# Patient Record
Sex: FEMALE | Race: WHITE | NOT HISPANIC OR LATINO | URBAN - METROPOLITAN AREA
[De-identification: names, ages, dates, MRNs, and addresses within clinical notes are randomized per-mention and may not be internally consistent; named-entity substitution may affect disease eponyms.]

---

## 2022-06-22 ENCOUNTER — TRANSFERRED RECORDS (OUTPATIENT)
Dept: HEALTH INFORMATION MANAGEMENT | Facility: CLINIC | Age: 57
End: 2022-06-22

## 2022-12-15 ENCOUNTER — LAB REQUISITION (OUTPATIENT)
Dept: LAB | Facility: CLINIC | Age: 57
End: 2022-12-15
Payer: COMMERCIAL

## 2022-12-15 ENCOUNTER — TRANSFERRED RECORDS (OUTPATIENT)
Dept: HEALTH INFORMATION MANAGEMENT | Facility: CLINIC | Age: 57
End: 2022-12-15

## 2022-12-15 DIAGNOSIS — D48.5 NEOPLASM OF UNCERTAIN BEHAVIOR OF SKIN: ICD-10-CM

## 2022-12-15 PROCEDURE — 88305 TISSUE EXAM BY PATHOLOGIST: CPT | Mod: TC,ORL | Performed by: DERMATOLOGY

## 2022-12-15 PROCEDURE — 88305 TISSUE EXAM BY PATHOLOGIST: CPT | Mod: 26 | Performed by: DERMATOLOGY

## 2022-12-19 ENCOUNTER — TELEPHONE (OUTPATIENT)
Dept: DERMATOLOGY | Facility: CLINIC | Age: 57
End: 2022-12-19
Payer: COMMERCIAL

## 2022-12-19 LAB
PATH REPORT.COMMENTS IMP SPEC: ABNORMAL
PATH REPORT.COMMENTS IMP SPEC: ABNORMAL
PATH REPORT.COMMENTS IMP SPEC: YES
PATH REPORT.FINAL DX SPEC: ABNORMAL
PATH REPORT.GROSS SPEC: ABNORMAL
PATH REPORT.MICROSCOPIC SPEC OTHER STN: ABNORMAL
PATH REPORT.RELEVANT HX SPEC: ABNORMAL

## 2022-12-19 NOTE — TELEPHONE ENCOUNTER
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 Thompson or Nahed. She will need mohs with immunostaining. She is aware and expecting your call. Name: Clarissa Cifuentes, CLARA 63, phone [phone]         Please let me know if you need anything else,    Thanks!              Yu Alberts MD    Dermatology    [facility]

## 2022-12-20 ENCOUNTER — MEDICAL CORRESPONDENCE (OUTPATIENT)
Dept: HEALTH INFORMATION MANAGEMENT | Facility: CLINIC | Age: 57
End: 2022-12-20

## 2022-12-20 ENCOUNTER — TRANSCRIBE ORDERS (OUTPATIENT)
Dept: OTHER | Age: 57
End: 2022-12-20

## 2022-12-20 DIAGNOSIS — D48.5 NEOPLASM OF UNCERTAIN BEHAVIOR OF SKIN: Primary | ICD-10-CM

## 2022-12-21 NOTE — TELEPHONE ENCOUNTER
Called patient to schedule surgery with Dr. Dockery    Date of Surgery: TBD    Surgery type: melanoma    Consult scheduled: Yes    Has patient had mohs with us before? No    Additional comments: waiting for consult to schedule mohs      Amy Gerber on 12/21/2022 at 10:13 AM

## 2022-12-23 NOTE — TELEPHONE ENCOUNTER
FUTURE VISIT INFORMATION      FUTURE VISIT INFORMATION:    Date: 1.17.23    Time: 3:15    Location: [facility]  REFERRAL INFORMATION:    Referring provider:  Aristeo    Referring providers clinic:  [facility]    Reason for visit/diagnosis  MIS on the L lower eyelid    RECORDS REQUESTED FROM:       Clinic name Comments Records Status Imaging Status   [facility] 12.15.22  Path # [id number]  CE

## 2023-01-03 ENCOUNTER — LAB REQUISITION (OUTPATIENT)
Dept: LAB | Facility: CLINIC | Age: 58
End: 2023-01-03
Payer: COMMERCIAL

## 2023-01-03 ENCOUNTER — TRANSFERRED RECORDS (OUTPATIENT)
Dept: HEALTH INFORMATION MANAGEMENT | Facility: CLINIC | Age: 58
End: 2023-01-03

## 2023-01-03 DIAGNOSIS — C44.519 BASAL CELL CARCINOMA OF SKIN OF OTHER PART OF TRUNK: ICD-10-CM

## 2023-01-03 PROCEDURE — 88305 TISSUE EXAM BY PATHOLOGIST: CPT | Mod: 26 | Performed by: DERMATOLOGY

## 2023-01-03 PROCEDURE — 88305 TISSUE EXAM BY PATHOLOGIST: CPT | Mod: TC,ORL | Performed by: DERMATOLOGY

## 2023-01-06 LAB
PATH REPORT.COMMENTS IMP SPEC: NORMAL
PATH REPORT.COMMENTS IMP SPEC: NORMAL
PATH REPORT.FINAL DX SPEC: NORMAL
PATH REPORT.GROSS SPEC: NORMAL
PATH REPORT.MICROSCOPIC SPEC OTHER STN: NORMAL
PATH REPORT.RELEVANT HX SPEC: NORMAL

## 2023-01-17 ENCOUNTER — PRE VISIT (OUTPATIENT)
Dept: DERMATOLOGY | Facility: CLINIC | Age: 58
End: 2023-01-17

## 2023-01-18 NOTE — TELEPHONE ENCOUNTER
Called pt about rescheduling consult for melanoma mohs. Pt going to [facility] for further care. Canceling order.     Amy Gerber, Procedure  1/18/2023 12:30 PM

## 2023-02-13 ENCOUNTER — TELEPHONE (OUTPATIENT)
Dept: DERMATOLOGY | Facility: CLINIC | Age: 58
End: 2023-02-13
Payer: COMMERCIAL

## 2023-02-13 NOTE — TELEPHONE ENCOUNTER
Called and spoke with pt. 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 I will call back tomorrow morning to schedule.     Amy Gerber, Procedure  2/13/2023 3:26 PM

## 2023-02-14 ENCOUNTER — TRANSFERRED RECORDS (OUTPATIENT)
Dept: HEALTH INFORMATION MANAGEMENT | Facility: CLINIC | Age: 58
End: 2023-02-14

## 2023-02-14 NOTE — TELEPHONE ENCOUNTER
Called and scheduled consultation with Dr. Thompson on 03/01. MIS L lower eyelid. did not schedule mpohs yet due to immunostaining and possibly oculoplastic repair.    Amy Gerber, Procedure  2/14/2023 9:00 AM

## 2023-02-21 NOTE — TELEPHONE ENCOUNTER
Patient called in this morning to cancel the consultation appt for March 1st. Pt declined rescheduling at this time.     Canceling order.     Amy Gerber, Procedure  2/21/2023 9:18 AM

## 2023-03-23 ENCOUNTER — TRANSCRIBE ORDERS (OUTPATIENT)
Dept: OTHER | Age: 58
End: 2023-03-23

## 2023-03-23 DIAGNOSIS — D03.10 MELANOMA IN SITU OF LEFT EYELID (H): Primary | ICD-10-CM

## 2023-03-27 ENCOUNTER — TELEPHONE (OUTPATIENT)
Dept: DERMATOLOGY | Facility: CLINIC | Age: 58
End: 2023-03-27
Payer: COMMERCIAL

## 2023-03-27 NOTE — TELEPHONE ENCOUNTER
No answer. No vm.     Pt had canceled both previous consultations for this.    Amy Gerber, Procedure  3/27/2023 11:08 AM

## 2023-03-30 NOTE — TELEPHONE ENCOUNTER
No answer. No vm.      Pt had canceled both previous consultations for this.     Amy Gerber, Procedure  3/30/2023 10:09 AM

## 2023-04-04 NOTE — TELEPHONE ENCOUNTER
Called referring provider and left a phone number for a nurse to call back. Pt has canceled appts with us and was at one point going to [place] for treatment. I have not been able to reach pt since she was referred to us again last week.     Amy Gerber, Procedure  4/4/2023 9:15 AM

## 2023-04-06 NOTE — TELEPHONE ENCOUNTER
I called and spoke with a nurse in Dr. Alberts's office. They confirmed that pt did have mohs at another office already. The order was sent to us again last week in error.     Amy Gerber, Procedure  4/6/2023 9:32 AM